# Patient Record
Sex: FEMALE | Race: WHITE | Employment: UNEMPLOYED | URBAN - METROPOLITAN AREA
[De-identification: names, ages, dates, MRNs, and addresses within clinical notes are randomized per-mention and may not be internally consistent; named-entity substitution may affect disease eponyms.]

---

## 2023-02-27 ENCOUNTER — APPOINTMENT (OUTPATIENT)
Dept: CT IMAGING | Age: 63
End: 2023-02-27
Attending: NURSE PRACTITIONER
Payer: COMMERCIAL

## 2023-02-27 ENCOUNTER — HOSPITAL ENCOUNTER (OUTPATIENT)
Age: 63
Discharge: HOME | End: 2023-02-27
Payer: COMMERCIAL

## 2023-02-27 VITALS
SYSTOLIC BLOOD PRESSURE: 133 MMHG | DIASTOLIC BLOOD PRESSURE: 89 MMHG | OXYGEN SATURATION: 98 % | BODY MASS INDEX: 38.32 KG/M2 | RESPIRATION RATE: 20 BRPM | WEIGHT: 230 LBS | TEMPERATURE: 97 F | HEIGHT: 65 IN | HEART RATE: 108 BPM

## 2023-02-27 DIAGNOSIS — K57.92 ACUTE DIVERTICULITIS: ICD-10-CM

## 2023-02-27 DIAGNOSIS — K86.89 PANCREATIC MASS: ICD-10-CM

## 2023-02-27 DIAGNOSIS — D25.1 FIBROIDS, INTRAMURAL: ICD-10-CM

## 2023-02-27 DIAGNOSIS — R10.32 LLQ PAIN: Primary | ICD-10-CM

## 2023-02-27 LAB
#MXD IC: 0.4 X10ˆ3/UL (ref 0.1–1)
BUN BLD-MCNC: 12 MG/DL (ref 7–18)
CHLORIDE BLD-SCNC: 101 MMOL/L (ref 98–112)
CO2 BLD-SCNC: 28 MMOL/L (ref 21–32)
CREAT BLD-MCNC: 0.7 MG/DL
GFR SERPLBLD BASED ON 1.73 SQ M-ARVRAT: 97 ML/MIN/1.73M2 (ref 60–?)
GLUCOSE BLD-MCNC: 109 MG/DL (ref 70–99)
HCT VFR BLD AUTO: 41.4 %
HCT VFR BLD CALC: 44 %
HGB BLD-MCNC: 13.1 G/DL
ISTAT IONIZED CALCIUM FOR CHEM 8: 1.19 MMOL/L (ref 1.12–1.32)
LYMPHOCYTES # BLD AUTO: 2.3 X10ˆ3/UL (ref 1–4)
LYMPHOCYTES NFR BLD AUTO: 24.8 %
MCH RBC QN AUTO: 27.7 PG (ref 26–34)
MCHC RBC AUTO-ENTMCNC: 31.6 G/DL (ref 31–37)
MCV RBC AUTO: 87.5 FL (ref 80–100)
MIXED CELL %: 4.7 %
NEUTROPHILS # BLD AUTO: 6.6 X10ˆ3/UL (ref 1.5–7.7)
NEUTROPHILS NFR BLD AUTO: 70.5 %
PLATELET # BLD AUTO: 419 X10ˆ3/UL (ref 150–450)
POTASSIUM BLD-SCNC: 4.5 MMOL/L (ref 3.6–5.1)
RBC # BLD AUTO: 4.73 X10ˆ6/UL
SODIUM BLD-SCNC: 139 MMOL/L (ref 136–145)
WBC # BLD AUTO: 9.3 X10ˆ3/UL (ref 4–11)

## 2023-02-27 PROCEDURE — 99214 OFFICE O/P EST MOD 30 MIN: CPT | Performed by: NURSE PRACTITIONER

## 2023-02-27 PROCEDURE — 85025 COMPLETE CBC W/AUTO DIFF WBC: CPT | Performed by: NURSE PRACTITIONER

## 2023-02-27 PROCEDURE — 80047 BASIC METABLC PNL IONIZED CA: CPT | Performed by: NURSE PRACTITIONER

## 2023-02-27 PROCEDURE — 74177 CT ABD & PELVIS W/CONTRAST: CPT | Performed by: NURSE PRACTITIONER

## 2023-02-27 PROCEDURE — 96374 THER/PROPH/DIAG INJ IV PUSH: CPT | Performed by: NURSE PRACTITIONER

## 2023-02-27 RX ORDER — KETOROLAC TROMETHAMINE 30 MG/ML
30 INJECTION, SOLUTION INTRAMUSCULAR; INTRAVENOUS ONCE
Status: COMPLETED | OUTPATIENT
Start: 2023-02-27 | End: 2023-02-27

## 2023-02-27 RX ORDER — LEVOTHYROXINE SODIUM 0.12 MG/1
TABLET ORAL
COMMUNITY
Start: 2022-12-08

## 2023-02-27 NOTE — DISCHARGE INSTRUCTIONS
GO to the ER for admission due to failure of outpatient antibiotic course for diverticulitis. Make sure to bring copy of CT scan for them to upload and have so you do not need further imaging.     Dr Sergei Rojas sending you and make sure to follow up with him next week

## 2023-02-27 NOTE — ED INITIAL ASSESSMENT (HPI)
Pt presents to the IC with c/o llq abd pain. Denies fevers, n/v/d or constipation. Pt was just treated with antibiotics for diverticulitis. Pain radiates to entire abd after eating.

## 2023-03-21 ENCOUNTER — HOSPITAL ENCOUNTER (OUTPATIENT)
Dept: CT IMAGING | Age: 63
Discharge: HOME | End: 2023-03-21
Attending: INTERNAL MEDICINE
Payer: COMMERCIAL

## 2023-03-21 DIAGNOSIS — E66.9 OBESITY, UNSPECIFIED: ICD-10-CM

## 2023-03-21 DIAGNOSIS — K57.92 DIVERTICULITIS: ICD-10-CM

## 2023-03-21 PROCEDURE — 74177 CT ABD & PELVIS W/CONTRAST: CPT | Performed by: INTERNAL MEDICINE

## 2023-04-03 ENCOUNTER — HOSPITAL ENCOUNTER (OUTPATIENT)
Dept: CT IMAGING | Facility: HOSPITAL | Age: 63
Discharge: HOME | End: 2023-04-03
Attending: INTERNAL MEDICINE
Payer: COMMERCIAL

## 2023-04-03 DIAGNOSIS — K57.32 DIVERTICULITIS OF COLON: ICD-10-CM

## 2023-04-03 LAB
CREAT BLD-MCNC: 0.8 MG/DL
GFR SERPLBLD BASED ON 1.73 SQ M-ARVRAT: 83 ML/MIN/1.73M2 (ref 60–?)

## 2023-04-03 PROCEDURE — 82565 ASSAY OF CREATININE: CPT

## 2023-04-03 PROCEDURE — 74177 CT ABD & PELVIS W/CONTRAST: CPT | Performed by: INTERNAL MEDICINE

## 2023-07-20 ENCOUNTER — LAB ENCOUNTER (OUTPATIENT)
Dept: LAB | Facility: HOSPITAL | Age: 63
End: 2023-07-20
Attending: INTERNAL MEDICINE
Payer: COMMERCIAL

## 2023-07-20 DIAGNOSIS — R73.03 PREDIABETES: ICD-10-CM

## 2023-07-20 DIAGNOSIS — E03.9 HYPOTHYROIDISM: Primary | ICD-10-CM

## 2023-07-20 DIAGNOSIS — E55.9 VITAMIN D DEFICIENCY: ICD-10-CM

## 2023-07-20 LAB
EST. AVERAGE GLUCOSE BLD GHB EST-MCNC: 120 MG/DL (ref 68–126)
HBA1C MFR BLD: 5.8 % (ref ?–5.7)
T4 FREE SERPL-MCNC: 1.2 NG/DL (ref 0.8–1.7)
TSI SER-ACNC: 4.82 MIU/ML (ref 0.36–3.74)
VIT D+METAB SERPL-MCNC: 26.1 NG/ML (ref 30–100)

## 2023-07-20 PROCEDURE — 36415 COLL VENOUS BLD VENIPUNCTURE: CPT

## 2023-07-20 PROCEDURE — 84443 ASSAY THYROID STIM HORMONE: CPT

## 2023-07-20 PROCEDURE — 83036 HEMOGLOBIN GLYCOSYLATED A1C: CPT

## 2023-07-20 PROCEDURE — 82306 VITAMIN D 25 HYDROXY: CPT

## 2023-07-20 PROCEDURE — 84439 ASSAY OF FREE THYROXINE: CPT

## 2023-08-09 DIAGNOSIS — R53.83 OTHER FATIGUE: ICD-10-CM

## 2023-08-09 DIAGNOSIS — E78.5 HYPERLIPIDEMIA, UNSPECIFIED HYPERLIPIDEMIA TYPE: Primary | ICD-10-CM

## 2023-08-09 DIAGNOSIS — E88.81 METABOLIC SYNDROME: ICD-10-CM

## 2023-08-09 NOTE — PROGRESS NOTES
Chronic fatigue, metabolic syndrome including prediabetes. Suspect hyperlipidemia, OSAS. Clinical lab and PSG ordered.

## 2023-08-10 ENCOUNTER — ORDER TRANSCRIPTION (OUTPATIENT)
Dept: SLEEP CENTER | Age: 63
End: 2023-08-10

## 2023-08-10 DIAGNOSIS — G47.33 OSA (OBSTRUCTIVE SLEEP APNEA): Primary | ICD-10-CM

## 2023-09-27 ENCOUNTER — OFFICE VISIT (OUTPATIENT)
Dept: SLEEP CENTER | Age: 63
End: 2023-09-27
Attending: INTERNAL MEDICINE
Payer: COMMERCIAL

## 2023-09-27 DIAGNOSIS — G47.33 OSA (OBSTRUCTIVE SLEEP APNEA): ICD-10-CM

## 2023-09-27 PROCEDURE — 95806 SLEEP STUDY UNATT&RESP EFFT: CPT

## 2023-09-28 ENCOUNTER — LAB ENCOUNTER (OUTPATIENT)
Dept: LAB | Age: 63
End: 2023-09-28
Attending: INTERNAL MEDICINE
Payer: COMMERCIAL

## 2023-09-28 DIAGNOSIS — E88.810 METABOLIC SYNDROME: ICD-10-CM

## 2023-09-28 DIAGNOSIS — E78.5 HYPERLIPIDEMIA, UNSPECIFIED HYPERLIPIDEMIA TYPE: ICD-10-CM

## 2023-09-28 DIAGNOSIS — R53.83 OTHER FATIGUE: ICD-10-CM

## 2023-09-28 LAB
ALBUMIN SERPL-MCNC: 3.6 G/DL (ref 3.4–5)
ALBUMIN/GLOB SERPL: 0.9 {RATIO} (ref 1–2)
ALP LIVER SERPL-CCNC: 111 U/L
ALT SERPL-CCNC: 30 U/L
ANION GAP SERPL CALC-SCNC: 5 MMOL/L (ref 0–18)
AST SERPL-CCNC: 14 U/L (ref 15–37)
BASOPHILS # BLD AUTO: 0.09 X10(3) UL (ref 0–0.2)
BASOPHILS NFR BLD AUTO: 1.2 %
BILIRUB SERPL-MCNC: 0.5 MG/DL (ref 0.1–2)
BUN BLD-MCNC: 12 MG/DL (ref 7–18)
BUN/CREAT SERPL: 20.7 (ref 10–20)
CALCIUM BLD-MCNC: 9.3 MG/DL (ref 8.5–10.1)
CHLORIDE SERPL-SCNC: 108 MMOL/L (ref 98–112)
CHOLEST SERPL-MCNC: 229 MG/DL (ref ?–200)
CO2 SERPL-SCNC: 28 MMOL/L (ref 21–32)
CREAT BLD-MCNC: 0.58 MG/DL
DEPRECATED RDW RBC AUTO: 44.7 FL (ref 35.1–46.3)
EGFRCR SERPLBLD CKD-EPI 2021: 101 ML/MIN/1.73M2 (ref 60–?)
EOSINOPHIL # BLD AUTO: 0.24 X10(3) UL (ref 0–0.7)
EOSINOPHIL NFR BLD AUTO: 3.1 %
ERYTHROCYTE [DISTWIDTH] IN BLOOD BY AUTOMATED COUNT: 14.3 % (ref 11–15)
FASTING PATIENT LIPID ANSWER: YES
FASTING STATUS PATIENT QL REPORTED: YES
GLOBULIN PLAS-MCNC: 4.1 G/DL (ref 2.8–4.4)
GLUCOSE BLD-MCNC: 106 MG/DL (ref 70–99)
HCT VFR BLD AUTO: 39.6 %
HDLC SERPL-MCNC: 56 MG/DL (ref 40–59)
HGB BLD-MCNC: 12.4 G/DL
IMM GRANULOCYTES # BLD AUTO: 0.01 X10(3) UL (ref 0–1)
IMM GRANULOCYTES NFR BLD: 0.1 %
LDLC SERPL CALC-MCNC: 155 MG/DL (ref ?–100)
LYMPHOCYTES # BLD AUTO: 2.56 X10(3) UL (ref 1–4)
LYMPHOCYTES NFR BLD AUTO: 33.2 %
MCH RBC QN AUTO: 27 PG (ref 26–34)
MCHC RBC AUTO-ENTMCNC: 31.3 G/DL (ref 31–37)
MCV RBC AUTO: 86.1 FL
MONOCYTES # BLD AUTO: 0.51 X10(3) UL (ref 0.1–1)
MONOCYTES NFR BLD AUTO: 6.6 %
NEUTROPHILS # BLD AUTO: 4.3 X10 (3) UL (ref 1.5–7.7)
NEUTROPHILS # BLD AUTO: 4.3 X10(3) UL (ref 1.5–7.7)
NEUTROPHILS NFR BLD AUTO: 55.8 %
NONHDLC SERPL-MCNC: 173 MG/DL (ref ?–130)
OSMOLALITY SERPL CALC.SUM OF ELEC: 292 MOSM/KG (ref 275–295)
PLATELET # BLD AUTO: 442 10(3)UL (ref 150–450)
POTASSIUM SERPL-SCNC: 4.5 MMOL/L (ref 3.5–5.1)
PROT SERPL-MCNC: 7.7 G/DL (ref 6.4–8.2)
RBC # BLD AUTO: 4.6 X10(6)UL
SODIUM SERPL-SCNC: 141 MMOL/L (ref 136–145)
TRIGL SERPL-MCNC: 99 MG/DL (ref 30–149)
VLDLC SERPL CALC-MCNC: 19 MG/DL (ref 0–30)
WBC # BLD AUTO: 7.7 X10(3) UL (ref 4–11)

## 2023-09-28 PROCEDURE — 80061 LIPID PANEL: CPT

## 2023-09-28 PROCEDURE — 36415 COLL VENOUS BLD VENIPUNCTURE: CPT

## 2023-09-28 PROCEDURE — 80053 COMPREHEN METABOLIC PANEL: CPT

## 2023-09-28 PROCEDURE — 85025 COMPLETE CBC W/AUTO DIFF WBC: CPT

## 2023-09-29 NOTE — PROCEDURES
[address] [facility]  Accredited by the [facility] ([facility])    PATIENT'S NAME: Parisa Gresham   ATTENDING PHYSICIAN: Henna Bermudez. Danna Saldaña MD   REFERRING PHYSICIAN: Henna Bermudez. Danna Saldaña MD   PATIENT ACCOUNT #: [de-identified] LOCATION: [address] [place] [address] RECORD #: [id number] YOB: 1960   DATE OF STUDY: 09/27/2023       SLEEP STUDY REPORT    STUDY TYPE:  Home sleep test.    INDICATION:  Suspected obstructive sleep apnea (ICD-10 code G47.33) in a patient with daytime drowsiness, snoring, hypertension, obesity, prediabetes, unrefreshing sleep, an Osburn Sleepiness Scale score 3/24, and body mass index 43.2. RESULTS:  The patient underwent home sleep test with measurement of her nasal airflow, nasal air pressure, snoring, chest and abdominal wall motion, oximetry, and body position. I have reviewed the entirety of the raw data of this study. During the study, the total recording time is 551 minutes. The lights-out clock time is 11:02 p.m., lights-on clock time is 8:13 a.m. The apnea plus hypopnea index is 27.5 events per hour. The supine apnea plus hypopnea index is 36.4 events per hour. The patient had an average oxygen saturation of 93%, the lowest oxygen saturation of 82%, and the average desaturation of 3.4%. The average heart rate was 65 beats per minute. The patient spent approximately 15% of the test in the supine position. INTERPRETATION:  The data generated from this study is consistent with moderately severe obstructive sleep apnea which becomes severe in the supine position (ICD-10 code G47.33). RECOMMENDATIONS:    1. CPAP titration. 2.   Weight loss. 3.   Avoid alcohol. 4.   Avoid sedating drug. 5.   Patient should not drive if at all sleepy. Please do not hesitate to contact me if there is any question whatsoever regarding interpretation of this study.     Dictated By Rosalind Manzanares MD  d: 09/29/2023 16:19:44  t: 09/29/2023 Lazaro Montague 6 [id number]  [facility]/    cc: Kyra Ames.  Pavithra Pittman MD

## 2023-11-14 ENCOUNTER — OFFICE VISIT (OUTPATIENT)
Dept: DERMATOLOGY CLINIC | Facility: CLINIC | Age: 63
End: 2023-11-14

## 2023-11-14 DIAGNOSIS — L21.9 SEBORRHEIC DERMATITIS: ICD-10-CM

## 2023-11-14 DIAGNOSIS — L30.9 ECZEMA, UNSPECIFIED TYPE: ICD-10-CM

## 2023-11-14 DIAGNOSIS — B07.9 VERRUCA VULGARIS: ICD-10-CM

## 2023-11-14 DIAGNOSIS — L91.8 INFLAMED ACROCHORDON: ICD-10-CM

## 2023-11-14 DIAGNOSIS — D22.9 MULTIPLE BENIGN NEVI: ICD-10-CM

## 2023-11-14 DIAGNOSIS — D49.2 NEOPLASM OF UNSPECIFIED BEHAVIOR OF BONE, SOFT TISSUE, AND SKIN: ICD-10-CM

## 2023-11-14 DIAGNOSIS — D18.01 CHERRY ANGIOMA: ICD-10-CM

## 2023-11-14 DIAGNOSIS — L81.4 LENTIGINES: ICD-10-CM

## 2023-11-14 DIAGNOSIS — L82.1 SEBORRHEIC KERATOSES: Primary | ICD-10-CM

## 2023-11-14 PROCEDURE — 11102 TANGNTL BX SKIN SINGLE LES: CPT | Performed by: STUDENT IN AN ORGANIZED HEALTH CARE EDUCATION/TRAINING PROGRAM

## 2023-11-14 PROCEDURE — 88305 TISSUE EXAM BY PATHOLOGIST: CPT | Performed by: STUDENT IN AN ORGANIZED HEALTH CARE EDUCATION/TRAINING PROGRAM

## 2023-11-14 PROCEDURE — 99204 OFFICE O/P NEW MOD 45 MIN: CPT | Performed by: STUDENT IN AN ORGANIZED HEALTH CARE EDUCATION/TRAINING PROGRAM

## 2023-11-14 PROCEDURE — 11200 RMVL SKIN TAGS UP TO&INC 15: CPT | Performed by: STUDENT IN AN ORGANIZED HEALTH CARE EDUCATION/TRAINING PROGRAM

## 2023-11-14 PROCEDURE — 17110 DESTRUCTION B9 LES UP TO 14: CPT | Performed by: STUDENT IN AN ORGANIZED HEALTH CARE EDUCATION/TRAINING PROGRAM

## 2023-11-14 RX ORDER — FLUOCINONIDE TOPICAL SOLUTION USP, 0.05% 0.5 MG/ML
SOLUTION TOPICAL
Qty: 60 ML | Refills: 5 | Status: SHIPPED | OUTPATIENT
Start: 2023-11-14

## 2023-11-14 NOTE — PROGRESS NOTES
November 14, 2023    New patient     CHIEF COMPLAINT: FBSE    HISTORY OF PRESENT ILLNESS: . 1. Moles  Location: Right side flank   Duration: Life  Signs and symptoms: None  Current treatment: None  Past treatments: None    DERM HISTORY:  History of skin cancer: No    FAMILY HISTORY:  History of melanoma: No    PAST MEDICAL HISTORY:  Past Medical History:   Diagnosis Date    Diverticulitis     Thyroid disease        REVIEW OF SYSTEMS:  Constitutional: Denies fever, chills, unintentional weight loss. Skin as per HPI    Medications  Current Outpatient Medications   Medication Sig Dispense Refill    levothyroxine 125 MCG Oral Tab TAKE 1 TABLET BY MOUTH EVERY DAY. EXCEPT ON SUNDAY TAKE 1 AND 1/2 TABLETS         PHYSICAL EXAM:    General: awake, alert, no acute distress  Skin: Skin exam was performed today including the following: head and face, scalp, neck, chest (including breasts and axillae), abdomen, back, bilateral upper extremities, bilateral lower extremities, hands, feet, digits, nails. Pertinent findings include:   - Scattered bright red-purple dome-shaped papules on the trunk and extremities   - Scattered light brown stellate macules on sun exposed sites  - Scattered, evenly colored, round brown macules and papules with regular borders on the trunk and extremities  - Numerous scattered skin-colored and brown, waxy, stuck-on papules and plaques on the trunk and extremities  - neck with numerous pedunculated brown papule  - R lateral chest with a pink scaly papule  - R abdomen with a pink scaly plaque  - Neck with pink scaly plaques  - L thumb with a verrucous papule    ASSESSMENT & PLAN:  Pathophysiology of diagnoses discussed with patient. Therapeutic options reviewed. Risks, benefits, and alternatives discussed with patient. Instructions reviewed at length. #Lentigines  #Seborrheic keratoses   #Cherry angiomas   - Reassurance provided regarding the benign nature of these lesions.     #Multiple benign nevi  - Complete skin exam performed today with no outlier lesions identified   - Reassured patient of benign nature of these lesions.   - Recommend daily photoprotection with broad-spectrum sunscreen, avoidance of sun during peak hours, and sun protective clothing.    - Dermoscopy was used for physical examination of pigmented lesions during today's office visit. #Neoplasm(s) of uncertain behavior of skin  - Shave biopsy performed today   - Will notify patient with results and arrange for appropriate definitive treatment, if indicated. Shave of lesion to establish and confirm diagnosis:  Photo taken: Yes    Risks, benefits, alternatives and personnel required for shave biopsy reviewed with patient. Risks discussed include, but not limited to: pain, bleeding, infection, scar, reaction to anesthetic, and recurrence/need for further treatment. Patient and physician agree as to site(s) to be biopsied. Patient verbalizes understanding and wishes to proceed. Site(s) prepped with alcohol and anesthetized with 1% lidocaine with epinephrine. Shave of lesion(s) performed to the level of the dermis. Specimen(s) from A. R lateral chest  sent for pathology to r/o ISK 50% ALCL and bandaging applied. Written and verbal wound care instructions provided to patient, understanding verbalized. #Verruca vulgaris  - Patient elected cryotherapy today     - Cryosurgery of non-malignant lesion(s)    Risks, benefits, alternatives and personnel required for cryosurgery reviewed with patient. Possible adverse effects reviewed including, but not limited to: redness, swelling, blister formation, postinflammatory pigment alteration, ring wart formation, scarring, recurrence. Pt verbalizes understanding and wishes to proceed. Cryosurgery performed with Liquid Nitrogen via cryostat spray gun to warts. 1 lesion(s) treated. Patient tolerated well.        Return to clinic: 2-3 years  or sooner if something concerning arises Myra Ma MD

## 2023-12-06 ENCOUNTER — OFFICE VISIT (OUTPATIENT)
Dept: DERMATOLOGY CLINIC | Facility: CLINIC | Age: 63
End: 2023-12-06

## 2023-12-06 DIAGNOSIS — L30.9 ECZEMA, UNSPECIFIED TYPE: Primary | ICD-10-CM

## 2023-12-06 DIAGNOSIS — L21.9 SEBORRHEIC DERMATITIS: ICD-10-CM

## 2023-12-06 DIAGNOSIS — D49.2 NEOPLASM OF UNSPECIFIED BEHAVIOR OF BONE, SOFT TISSUE, AND SKIN: ICD-10-CM

## 2023-12-06 PROCEDURE — 99213 OFFICE O/P EST LOW 20 MIN: CPT | Performed by: STUDENT IN AN ORGANIZED HEALTH CARE EDUCATION/TRAINING PROGRAM

## 2023-12-06 RX ORDER — SEMAGLUTIDE 0.68 MG/ML
INJECTION, SOLUTION SUBCUTANEOUS
COMMUNITY

## 2023-12-06 NOTE — PROGRESS NOTES
December 6, 2023    Established Patient    CHIEF COMPLAINT: Follow Up    HISTORY OF PRESENT ILLNESS: .    Eczema  Location: Right side of abdomen, scalp, chest, neck   Condition Update: Scaly pink patch, no symptoms associated. No change to patch on abdomen. Other areas have improved. Current treatment: Fluocinonide 0.05 % External Solution (scalp), hydrocortisone 2.5 % External Cream (Neck, chest, abdomen)  Past treatments: None    DERM HISTORY:  History of skin cancer: No    FAMILY HISTORY:  History of melanoma: No    PAST MEDICAL HISTORY:  Past Medical History:   Diagnosis Date    Diverticulitis     Thyroid disease        REVIEW OF SYSTEMS:  Constitutional: Denies fever, chills, unintentional weight loss. Skin as per [facility]    Medications  Current Outpatient Medications   Medication Sig Dispense Refill    Fluocinonide 0.05 % External Solution Use twice daily Monday-Friday with flares on back of neck and abdomen. Do not use on face. 60 mL 5    hydrocortisone 2.5 % External Cream Apply every morning and evening with flares of flaking in eyebrows 28 g 3    levothyroxine 125 MCG Oral Tab TAKE 1 TABLET BY MOUTH EVERY DAY. EXCEPT ON SUNDAY TAKE 1 AND 1/2 TABLETS         PHYSICAL EXAM:    General: awake, alert, no acute distress  Skin: Skin exam was performed today including the following: head and face, scalp, neck, chest (including breasts and axillae), abdomen, back, bilateral upper extremities, bilateral lower extremities, hands, feet, digits, nails. Pertinent findings include:   - Neck and face clear today    ASSESSMENT & PLAN:  Pathophysiology of diagnoses discussed with patient. Therapeutic options reviewed. Risks, benefits, and alternatives discussed with patient. Instructions reviewed at length.     #Eczematous Dermatitis  #Seborrheic dermatitis  - Continue lidex and hydrocortisone to affected areas as prescribed twice daily Monday-Friday     #Neoplasm, R abdomen  - Initially suspected to be dermatitis, but not improved with topical steroids. Cryotherapy performed today. If not improved in 4 weeks will plan for biopsy in 2-3 months     - Cryotherapy today. - N/C    - Cryosurgery of non-malignant lesion(s)  - Risks, benefits, alternatives and personnel required for cryosurgery reviewed with patient. Discussed the expected redness, as well as the risks of swelling, blistering, crusting, pigmentary changes, and permanent scarring. . Discussed that lesion may need additional treatments in future or may recur. Pt verbalizes understanding and wishes to proceed. - Cryosurgery performed with Liquid Nitrogen via cryostat spray gun to possible inflamed seborrheic keratoses. 1 lesion(s) treated. - Patient tolerated well and wound care discussed.        Return to clinic: 2-3 years  or sooner if something concerning arises     Jason Elaine MD

## 2024-01-18 ENCOUNTER — LAB ENCOUNTER (OUTPATIENT)
Dept: LAB | Age: 64
End: 2024-01-18
Attending: INTERNAL MEDICINE
Payer: COMMERCIAL

## 2024-01-18 DIAGNOSIS — E03.9 HYPOTHYROIDISM, ADULT: ICD-10-CM

## 2024-01-18 DIAGNOSIS — R73.03 BORDERLINE DIABETES: Primary | ICD-10-CM

## 2024-01-18 LAB
ALBUMIN SERPL-MCNC: 4.6 G/DL (ref 3.2–4.8)
ALBUMIN/GLOB SERPL: 1.6 {RATIO} (ref 1–2)
ALP LIVER SERPL-CCNC: 111 U/L
ALT SERPL-CCNC: 14 U/L
ANION GAP SERPL CALC-SCNC: 4 MMOL/L (ref 0–18)
AST SERPL-CCNC: 12 U/L (ref ?–34)
BILIRUB SERPL-MCNC: 0.5 MG/DL (ref 0.2–1.1)
BUN BLD-MCNC: 9 MG/DL (ref 9–23)
BUN/CREAT SERPL: 13.6 (ref 10–20)
CALCIUM BLD-MCNC: 10 MG/DL (ref 8.7–10.4)
CHLORIDE SERPL-SCNC: 105 MMOL/L (ref 98–112)
CHOLEST SERPL-MCNC: 220 MG/DL (ref ?–200)
CO2 SERPL-SCNC: 29 MMOL/L (ref 21–32)
CREAT BLD-MCNC: 0.66 MG/DL
EGFRCR SERPLBLD CKD-EPI 2021: 98 ML/MIN/1.73M2 (ref 60–?)
EST. AVERAGE GLUCOSE BLD GHB EST-MCNC: 114 MG/DL (ref 68–126)
FASTING PATIENT LIPID ANSWER: YES
FASTING STATUS PATIENT QL REPORTED: YES
GLOBULIN PLAS-MCNC: 2.9 G/DL (ref 2.8–4.4)
GLUCOSE BLD-MCNC: 96 MG/DL (ref 70–99)
HBA1C MFR BLD: 5.6 % (ref ?–5.7)
HDLC SERPL-MCNC: 57 MG/DL (ref 40–59)
LDLC SERPL CALC-MCNC: 146 MG/DL (ref ?–100)
NONHDLC SERPL-MCNC: 163 MG/DL (ref ?–130)
OSMOLALITY SERPL CALC.SUM OF ELEC: 285 MOSM/KG (ref 275–295)
POTASSIUM SERPL-SCNC: 4.2 MMOL/L (ref 3.5–5.1)
PROT SERPL-MCNC: 7.5 G/DL (ref 5.7–8.2)
SODIUM SERPL-SCNC: 138 MMOL/L (ref 136–145)
T3FREE SERPL-MCNC: 3.63 PG/ML (ref 2.4–4.2)
T4 FREE SERPL-MCNC: 2 NG/DL (ref 0.8–1.7)
TRIGL SERPL-MCNC: 95 MG/DL (ref 30–149)
TSI SER-ACNC: 0.01 MIU/ML (ref 0.55–4.78)
VLDLC SERPL CALC-MCNC: 18 MG/DL (ref 0–30)

## 2024-01-18 PROCEDURE — 80061 LIPID PANEL: CPT

## 2024-01-18 PROCEDURE — 83036 HEMOGLOBIN GLYCOSYLATED A1C: CPT

## 2024-01-18 PROCEDURE — 36415 COLL VENOUS BLD VENIPUNCTURE: CPT

## 2024-01-18 PROCEDURE — 80053 COMPREHEN METABOLIC PANEL: CPT

## 2024-01-18 PROCEDURE — 84481 FREE ASSAY (FT-3): CPT

## 2024-01-18 PROCEDURE — 84439 ASSAY OF FREE THYROXINE: CPT

## 2024-01-18 PROCEDURE — 84443 ASSAY THYROID STIM HORMONE: CPT

## 2024-04-04 ENCOUNTER — ORDER TRANSCRIPTION (OUTPATIENT)
Dept: SLEEP CENTER | Age: 64
End: 2024-04-04

## 2024-04-04 DIAGNOSIS — G47.33 OBSTRUCTIVE SLEEP APNEA (ADULT) (PEDIATRIC): Primary | ICD-10-CM

## 2024-04-29 ENCOUNTER — ORDER TRANSCRIPTION (OUTPATIENT)
Dept: SLEEP CENTER | Age: 64
End: 2024-04-29

## 2024-05-14 ENCOUNTER — OFFICE VISIT (OUTPATIENT)
Dept: SLEEP CENTER | Age: 64
End: 2024-05-14
Attending: INTERNAL MEDICINE

## 2024-05-14 DIAGNOSIS — G47.33 OBSTRUCTIVE SLEEP APNEA (ADULT) (PEDIATRIC): ICD-10-CM

## 2024-05-14 PROCEDURE — 95810 POLYSOM 6/> YRS 4/> PARAM: CPT

## 2024-07-18 ENCOUNTER — LAB ENCOUNTER (OUTPATIENT)
Dept: LAB | Facility: HOSPITAL | Age: 64
End: 2024-07-18
Attending: INTERNAL MEDICINE
Payer: MEDICARE

## 2024-07-18 DIAGNOSIS — E03.9 HYPOTHYROIDISM: Primary | ICD-10-CM

## 2024-07-18 DIAGNOSIS — E11.9 DM (DIABETES MELLITUS) (HCC): ICD-10-CM

## 2024-07-18 LAB
ALBUMIN SERPL-MCNC: 4.6 G/DL (ref 3.2–4.8)
ALBUMIN/GLOB SERPL: 1.6 {RATIO} (ref 1–2)
ALP LIVER SERPL-CCNC: 128 U/L
ALT SERPL-CCNC: 10 U/L
ANION GAP SERPL CALC-SCNC: 9 MMOL/L (ref 0–18)
AST SERPL-CCNC: 14 U/L (ref ?–34)
BILIRUB SERPL-MCNC: 0.5 MG/DL (ref 0.2–1.1)
BUN BLD-MCNC: 8 MG/DL (ref 9–23)
BUN/CREAT SERPL: 12.3 (ref 10–20)
CALCIUM BLD-MCNC: 9.9 MG/DL (ref 8.7–10.4)
CHLORIDE SERPL-SCNC: 107 MMOL/L (ref 98–112)
CHOLEST SERPL-MCNC: 161 MG/DL (ref ?–200)
CO2 SERPL-SCNC: 25 MMOL/L (ref 21–32)
CREAT BLD-MCNC: 0.65 MG/DL
EGFRCR SERPLBLD CKD-EPI 2021: 98 ML/MIN/1.73M2 (ref 60–?)
EST. AVERAGE GLUCOSE BLD GHB EST-MCNC: 111 MG/DL (ref 68–126)
FASTING PATIENT LIPID ANSWER: YES
FASTING STATUS PATIENT QL REPORTED: YES
GLOBULIN PLAS-MCNC: 2.8 G/DL (ref 2–3.5)
GLUCOSE BLD-MCNC: 91 MG/DL (ref 70–99)
HBA1C MFR BLD: 5.5 % (ref ?–5.7)
HDLC SERPL-MCNC: 48 MG/DL (ref 40–59)
LDLC SERPL CALC-MCNC: 98 MG/DL (ref ?–100)
NONHDLC SERPL-MCNC: 113 MG/DL (ref ?–130)
OSMOLALITY SERPL CALC.SUM OF ELEC: 290 MOSM/KG (ref 275–295)
POTASSIUM SERPL-SCNC: 4.4 MMOL/L (ref 3.5–5.1)
PROT SERPL-MCNC: 7.4 G/DL (ref 5.7–8.2)
SODIUM SERPL-SCNC: 141 MMOL/L (ref 136–145)
T3FREE SERPL-MCNC: 3.44 PG/ML (ref 2.4–4.2)
T4 SERPL-MCNC: 17 UG/DL
TRIGL SERPL-MCNC: 80 MG/DL (ref 30–149)
TSI SER-ACNC: 0.01 MIU/ML (ref 0.55–4.78)
VLDLC SERPL CALC-MCNC: 13 MG/DL (ref 0–30)

## 2024-07-18 PROCEDURE — 36415 COLL VENOUS BLD VENIPUNCTURE: CPT

## 2024-07-18 PROCEDURE — 84481 FREE ASSAY (FT-3): CPT

## 2024-07-18 PROCEDURE — 80053 COMPREHEN METABOLIC PANEL: CPT

## 2024-07-18 PROCEDURE — 84443 ASSAY THYROID STIM HORMONE: CPT

## 2024-07-18 PROCEDURE — 83036 HEMOGLOBIN GLYCOSYLATED A1C: CPT

## 2024-07-18 PROCEDURE — 84436 ASSAY OF TOTAL THYROXINE: CPT

## 2024-07-18 PROCEDURE — 80061 LIPID PANEL: CPT

## 2025-03-06 ENCOUNTER — LAB ENCOUNTER (OUTPATIENT)
Dept: LAB | Facility: HOSPITAL | Age: 65
End: 2025-03-06
Attending: INTERNAL MEDICINE
Payer: MEDICARE

## 2025-03-06 DIAGNOSIS — E03.9 ACQUIRED HYPOTHYROIDISM: ICD-10-CM

## 2025-03-06 DIAGNOSIS — E78.2 MIXED HYPERLIPIDEMIA: ICD-10-CM

## 2025-03-06 DIAGNOSIS — R73.03 BORDERLINE DIABETES: Primary | ICD-10-CM

## 2025-03-06 LAB
ALBUMIN SERPL-MCNC: 4.5 G/DL (ref 3.2–4.8)
ALBUMIN/GLOB SERPL: 2 {RATIO} (ref 1–2)
ALP LIVER SERPL-CCNC: 81 U/L
ALT SERPL-CCNC: 10 U/L
ANION GAP SERPL CALC-SCNC: 8 MMOL/L (ref 0–18)
AST SERPL-CCNC: 12 U/L (ref ?–34)
BILIRUB SERPL-MCNC: 0.4 MG/DL (ref 0.2–1.1)
BUN BLD-MCNC: 8 MG/DL (ref 9–23)
BUN/CREAT SERPL: 12.5 (ref 10–20)
CALCIUM BLD-MCNC: 9.1 MG/DL (ref 8.7–10.4)
CHLORIDE SERPL-SCNC: 105 MMOL/L (ref 98–112)
CHOLEST SERPL-MCNC: 215 MG/DL (ref ?–200)
CO2 SERPL-SCNC: 26 MMOL/L (ref 21–32)
CREAT BLD-MCNC: 0.64 MG/DL
EGFRCR SERPLBLD CKD-EPI 2021: 98 ML/MIN/1.73M2 (ref 60–?)
EST. AVERAGE GLUCOSE BLD GHB EST-MCNC: 105 MG/DL (ref 68–126)
FASTING PATIENT LIPID ANSWER: YES
FASTING STATUS PATIENT QL REPORTED: YES
GLOBULIN PLAS-MCNC: 2.3 G/DL (ref 2–3.5)
GLUCOSE BLD-MCNC: 122 MG/DL (ref 70–99)
HBA1C MFR BLD: 5.3 % (ref ?–5.7)
HDLC SERPL-MCNC: 57 MG/DL (ref 40–59)
LDLC SERPL CALC-MCNC: 139 MG/DL (ref ?–100)
NONHDLC SERPL-MCNC: 158 MG/DL (ref ?–130)
OSMOLALITY SERPL CALC.SUM OF ELEC: 288 MOSM/KG (ref 275–295)
POTASSIUM SERPL-SCNC: 4.4 MMOL/L (ref 3.5–5.1)
PROT SERPL-MCNC: 6.8 G/DL (ref 5.7–8.2)
SODIUM SERPL-SCNC: 139 MMOL/L (ref 136–145)
T3FREE SERPL-MCNC: 3.76 PG/ML (ref 2.4–4.2)
T4 FREE SERPL-MCNC: 1.9 NG/DL (ref 0.8–1.7)
TRIGL SERPL-MCNC: 104 MG/DL (ref 30–149)
TSI SER-ACNC: 0.02 UIU/ML (ref 0.55–4.78)
VLDLC SERPL CALC-MCNC: 19 MG/DL (ref 0–30)

## 2025-03-06 PROCEDURE — 84481 FREE ASSAY (FT-3): CPT

## 2025-03-06 PROCEDURE — 83036 HEMOGLOBIN GLYCOSYLATED A1C: CPT

## 2025-03-06 PROCEDURE — 36415 COLL VENOUS BLD VENIPUNCTURE: CPT

## 2025-03-06 PROCEDURE — 80061 LIPID PANEL: CPT

## 2025-03-06 PROCEDURE — 80053 COMPREHEN METABOLIC PANEL: CPT

## 2025-03-06 PROCEDURE — 84443 ASSAY THYROID STIM HORMONE: CPT

## 2025-03-06 PROCEDURE — 84439 ASSAY OF FREE THYROXINE: CPT

## 2025-05-22 ENCOUNTER — LAB ENCOUNTER (OUTPATIENT)
Dept: LAB | Facility: HOSPITAL | Age: 65
End: 2025-05-22
Attending: INTERNAL MEDICINE
Payer: MEDICARE

## 2025-05-22 DIAGNOSIS — E78.2 MIXED HYPERLIPIDEMIA: Primary | ICD-10-CM

## 2025-05-22 DIAGNOSIS — E03.9 HYPOTHYROIDISM: ICD-10-CM

## 2025-05-22 LAB
ALBUMIN SERPL-MCNC: 4.7 G/DL (ref 3.2–4.8)
ALBUMIN/GLOB SERPL: 2 {RATIO} (ref 1–2)
ALP LIVER SERPL-CCNC: 94 U/L (ref 50–130)
ALT SERPL-CCNC: 15 U/L (ref 10–49)
ANION GAP SERPL CALC-SCNC: 6 MMOL/L (ref 0–18)
AST SERPL-CCNC: 17 U/L (ref ?–34)
BILIRUB SERPL-MCNC: 0.5 MG/DL (ref 0.2–1.1)
BUN BLD-MCNC: 10 MG/DL (ref 9–23)
BUN/CREAT SERPL: 15.9 (ref 10–20)
CALCIUM BLD-MCNC: 9.5 MG/DL (ref 8.7–10.4)
CHLORIDE SERPL-SCNC: 105 MMOL/L (ref 98–112)
CHOLEST SERPL-MCNC: 212 MG/DL (ref ?–200)
CO2 SERPL-SCNC: 28 MMOL/L (ref 21–32)
CREAT BLD-MCNC: 0.63 MG/DL (ref 0.55–1.02)
EGFRCR SERPLBLD CKD-EPI 2021: 98 ML/MIN/1.73M2 (ref 60–?)
FASTING PATIENT LIPID ANSWER: YES
FASTING STATUS PATIENT QL REPORTED: YES
GLOBULIN PLAS-MCNC: 2.3 G/DL (ref 2–3.5)
GLUCOSE BLD-MCNC: 94 MG/DL (ref 70–99)
HDLC SERPL-MCNC: 63 MG/DL (ref 40–59)
LDLC SERPL CALC-MCNC: 130 MG/DL (ref ?–100)
NONHDLC SERPL-MCNC: 149 MG/DL (ref ?–130)
OSMOLALITY SERPL CALC.SUM OF ELEC: 287 MOSM/KG (ref 275–295)
POTASSIUM SERPL-SCNC: 4.2 MMOL/L (ref 3.5–5.1)
PROT SERPL-MCNC: 7 G/DL (ref 5.7–8.2)
SODIUM SERPL-SCNC: 139 MMOL/L (ref 136–145)
T3FREE SERPL-MCNC: 2.61 PG/ML (ref 2.4–4.2)
T4 FREE SERPL-MCNC: 1.5 NG/DL (ref 0.8–1.7)
TRIGL SERPL-MCNC: 109 MG/DL (ref 30–149)
TSI SER-ACNC: 0.05 UIU/ML (ref 0.55–4.78)
VLDLC SERPL CALC-MCNC: 20 MG/DL (ref 0–30)

## 2025-05-22 PROCEDURE — 36415 COLL VENOUS BLD VENIPUNCTURE: CPT

## 2025-05-22 PROCEDURE — 80053 COMPREHEN METABOLIC PANEL: CPT

## 2025-05-22 PROCEDURE — 84443 ASSAY THYROID STIM HORMONE: CPT

## 2025-05-22 PROCEDURE — 84439 ASSAY OF FREE THYROXINE: CPT

## 2025-05-22 PROCEDURE — 84481 FREE ASSAY (FT-3): CPT

## 2025-05-22 PROCEDURE — 80061 LIPID PANEL: CPT
